# Patient Record
Sex: MALE | Race: WHITE | NOT HISPANIC OR LATINO | Employment: OTHER | URBAN - METROPOLITAN AREA
[De-identification: names, ages, dates, MRNs, and addresses within clinical notes are randomized per-mention and may not be internally consistent; named-entity substitution may affect disease eponyms.]

---

## 2019-05-16 PROBLEM — M79.674 PAIN OF TOE OF RIGHT FOOT: Status: ACTIVE | Noted: 2019-05-16

## 2019-05-16 PROBLEM — E78.5 HLD (HYPERLIPIDEMIA): Status: ACTIVE | Noted: 2017-09-27

## 2019-05-16 PROBLEM — I10 ESSENTIAL HYPERTENSION: Status: ACTIVE | Noted: 2019-05-16

## 2019-05-16 PROBLEM — Z98.61 CAD S/P PERCUTANEOUS CORONARY ANGIOPLASTY: Status: ACTIVE | Noted: 2019-05-16

## 2019-05-16 PROBLEM — Z79.4 TYPE 2 DIABETES MELLITUS WITH DIABETIC NEUROPATHY, WITH LONG-TERM CURRENT USE OF INSULIN: Status: ACTIVE | Noted: 2019-05-16

## 2019-05-16 PROBLEM — E11.40 TYPE 2 DIABETES MELLITUS WITH DIABETIC NEUROPATHY, WITH LONG-TERM CURRENT USE OF INSULIN: Status: ACTIVE | Noted: 2019-05-16

## 2019-05-16 PROBLEM — I25.118 CORONARY ARTERY DISEASE OF NATIVE HEART WITH STABLE ANGINA PECTORIS: Status: ACTIVE | Noted: 2019-05-16

## 2019-05-16 PROBLEM — I25.10 CAD S/P PERCUTANEOUS CORONARY ANGIOPLASTY: Status: ACTIVE | Noted: 2019-05-16

## 2019-05-16 PROBLEM — G47.33 OSA (OBSTRUCTIVE SLEEP APNEA): Status: ACTIVE | Noted: 2017-09-27

## 2019-09-27 PROBLEM — E11.9 DIABETES MELLITUS WITHOUT COMPLICATION: Status: ACTIVE | Noted: 2019-09-27

## 2019-09-27 PROBLEM — H10.10 ALLERGIC CONJUNCTIVITIS: Status: ACTIVE | Noted: 2019-09-27

## 2019-11-22 PROBLEM — E11.40 TYPE 2 DIABETES MELLITUS WITH DIABETIC NEUROPATHY, WITH LONG-TERM CURRENT USE OF INSULIN: Status: RESOLVED | Noted: 2019-05-16 | Resolved: 2019-11-22

## 2019-11-22 PROBLEM — Z79.4 TYPE 2 DIABETES MELLITUS WITH DIABETIC NEUROPATHY, WITH LONG-TERM CURRENT USE OF INSULIN: Status: RESOLVED | Noted: 2019-05-16 | Resolved: 2019-11-22

## 2019-11-22 PROBLEM — S86.011A RUPTURE OF RIGHT ACHILLES TENDON: Status: ACTIVE | Noted: 2017-10-20

## 2019-11-28 PROBLEM — E11.8 TYPE 2 DIABETES MELLITUS WITH COMPLICATION, WITH LONG-TERM CURRENT USE OF INSULIN: Status: ACTIVE | Noted: 2017-08-08

## 2019-11-28 PROBLEM — Z79.4 TYPE 2 DIABETES MELLITUS WITH COMPLICATION, WITH LONG-TERM CURRENT USE OF INSULIN: Status: ACTIVE | Noted: 2017-08-08

## 2019-11-28 PROBLEM — R15.9 BOWEL INCONTINENCE: Status: ACTIVE | Noted: 2017-07-11

## 2019-12-20 PROBLEM — H40.003 GLAUCOMA SUSPECT OF BOTH EYES: Status: ACTIVE | Noted: 2019-12-20

## 2019-12-20 PROBLEM — H25.813 COMBINED FORMS OF AGE-RELATED CATARACT OF BOTH EYES: Status: ACTIVE | Noted: 2019-12-20

## 2020-01-02 PROBLEM — F33.1 MODERATE EPISODE OF RECURRENT MAJOR DEPRESSIVE DISORDER: Status: ACTIVE | Noted: 2020-01-02

## 2020-01-23 PROBLEM — R44.1 FORMED VISUAL HALLUCINATIONS: Status: ACTIVE | Noted: 2020-01-23

## 2020-12-07 PROBLEM — F33.3 MDD (MAJOR DEPRESSIVE DISORDER), RECURRENT, SEVERE, WITH PSYCHOSIS: Status: ACTIVE | Noted: 2020-01-02

## 2020-12-07 PROBLEM — F10.20 ALCOHOL USE DISORDER, SEVERE, DEPENDENCE: Status: ACTIVE | Noted: 2020-12-07

## 2021-05-12 ENCOUNTER — PATIENT MESSAGE (OUTPATIENT)
Dept: RESEARCH | Facility: HOSPITAL | Age: 56
End: 2021-05-12

## 2021-06-08 PROBLEM — M51.369 DEGENERATIVE DISC DISEASE, LUMBAR: Status: ACTIVE | Noted: 2021-06-08

## 2021-06-08 PROBLEM — J43.9 PULMONARY EMPHYSEMA: Status: ACTIVE | Noted: 2021-06-08

## 2021-06-08 PROBLEM — M51.36 DEGENERATIVE DISC DISEASE, LUMBAR: Status: ACTIVE | Noted: 2021-06-08

## 2021-06-08 PROBLEM — I77.810 ACQUIRED DILATION OF ASCENDING AORTA AND AORTIC ROOT: Status: ACTIVE | Noted: 2021-06-08

## 2021-06-08 PROBLEM — I77.819 ACQUIRED DILATION OF ASCENDING AORTA AND AORTIC ROOT: Status: ACTIVE | Noted: 2021-06-08

## 2021-07-24 PROBLEM — M54.9 BACK PAIN: Status: ACTIVE | Noted: 2021-07-24

## 2021-07-24 PROBLEM — R31.9 HEMATURIA: Status: ACTIVE | Noted: 2021-07-24

## 2021-07-24 PROBLEM — R07.9 CHEST PAIN: Status: ACTIVE | Noted: 2021-07-24

## 2021-07-24 PROBLEM — E87.1 HYPONATREMIA: Status: ACTIVE | Noted: 2021-07-24

## 2021-07-24 PROBLEM — N28.1 RENAL CYST: Status: ACTIVE | Noted: 2021-07-24

## 2021-07-24 PROBLEM — R19.7 DIARRHEA: Status: ACTIVE | Noted: 2021-07-24

## 2021-07-24 PROBLEM — J44.9 COPD (CHRONIC OBSTRUCTIVE PULMONARY DISEASE): Status: ACTIVE | Noted: 2021-07-24

## 2021-07-24 PROBLEM — N17.9 AKI (ACUTE KIDNEY INJURY): Status: ACTIVE | Noted: 2021-07-24

## 2021-11-29 PROBLEM — M54.9 BACK PAIN: Status: RESOLVED | Noted: 2021-07-24 | Resolved: 2021-11-29

## 2021-11-29 PROBLEM — R44.1 FORMED VISUAL HALLUCINATIONS: Status: RESOLVED | Noted: 2020-01-23 | Resolved: 2021-11-29

## 2021-11-29 PROBLEM — Z72.0 TOBACCO ABUSE: Status: ACTIVE | Noted: 2021-11-29

## 2021-11-29 PROBLEM — N17.9 AKI (ACUTE KIDNEY INJURY): Status: RESOLVED | Noted: 2021-07-24 | Resolved: 2021-11-29

## 2021-11-29 PROBLEM — S86.011A RUPTURE OF RIGHT ACHILLES TENDON: Status: RESOLVED | Noted: 2017-10-20 | Resolved: 2021-11-29

## 2021-11-29 PROBLEM — E87.1 HYPONATREMIA: Status: RESOLVED | Noted: 2021-07-24 | Resolved: 2021-11-29

## 2021-11-29 PROBLEM — M79.674 PAIN OF TOE OF RIGHT FOOT: Status: RESOLVED | Noted: 2019-05-16 | Resolved: 2021-11-29

## 2021-11-29 PROBLEM — R31.9 HEMATURIA: Status: RESOLVED | Noted: 2021-07-24 | Resolved: 2021-11-29

## 2021-11-29 PROBLEM — R07.9 CHEST PAIN: Status: RESOLVED | Noted: 2021-07-24 | Resolved: 2021-11-29

## 2021-11-29 PROBLEM — R19.7 DIARRHEA: Status: RESOLVED | Noted: 2021-07-24 | Resolved: 2021-11-29

## 2021-11-29 PROBLEM — R15.9 BOWEL INCONTINENCE: Status: RESOLVED | Noted: 2017-07-11 | Resolved: 2021-11-29

## 2022-03-14 PROBLEM — H10.10 ALLERGIC CONJUNCTIVITIS: Status: RESOLVED | Noted: 2019-09-27 | Resolved: 2022-03-14

## 2022-03-14 PROBLEM — H40.003 GLAUCOMA SUSPECT OF BOTH EYES: Status: RESOLVED | Noted: 2019-12-20 | Resolved: 2022-03-14

## 2022-09-29 PROBLEM — I50.33 ACUTE ON CHRONIC HEART FAILURE WITH PRESERVED EJECTION FRACTION: Status: ACTIVE | Noted: 2022-09-29

## 2022-09-29 PROBLEM — R07.89 OTHER CHEST PAIN: Status: ACTIVE | Noted: 2021-07-24

## 2022-09-30 PROBLEM — J06.9 UPPER RESPIRATORY TRACT INFECTION: Status: ACTIVE | Noted: 2022-09-30

## 2022-09-30 PROBLEM — E87.6 HYPOKALEMIA: Status: ACTIVE | Noted: 2022-09-30

## 2022-09-30 PROBLEM — I20.89 ANGINA AT REST: Status: ACTIVE | Noted: 2021-07-24

## 2022-10-01 PROBLEM — J44.1 COPD EXACERBATION: Status: ACTIVE | Noted: 2021-07-24

## 2022-10-02 PROBLEM — I49.8 VENTRICULAR BIGEMINY: Status: ACTIVE | Noted: 2022-10-02

## 2022-11-26 ENCOUNTER — PATIENT OUTREACH (OUTPATIENT)
Dept: ADMINISTRATIVE | Facility: HOSPITAL | Age: 57
End: 2022-11-26

## 2023-11-06 PROBLEM — F10.10 ALCOHOL USE DISORDER, MILD, ABUSE: Status: ACTIVE | Noted: 2020-12-07

## 2023-11-06 PROBLEM — F15.20 AMPHETAMINE USE DISORDER, MODERATE, DEPENDENCE: Status: ACTIVE | Noted: 2023-11-06

## 2023-12-03 PROBLEM — M86.9 OSTEOMYELITIS: Status: ACTIVE | Noted: 2023-12-03

## 2023-12-03 PROBLEM — J44.9 COPD (CHRONIC OBSTRUCTIVE PULMONARY DISEASE): Status: ACTIVE | Noted: 2023-12-03

## 2023-12-03 PROBLEM — I50.30 (HFPEF) HEART FAILURE WITH PRESERVED EJECTION FRACTION: Status: ACTIVE | Noted: 2023-12-03

## 2023-12-04 PROBLEM — B99.9 INFECTION: Status: RESOLVED | Noted: 2023-12-04 | Resolved: 2023-12-04

## 2023-12-04 PROBLEM — B99.9 INFECTION: Status: ACTIVE | Noted: 2023-12-04

## 2023-12-04 PROBLEM — R06.02 SHORTNESS OF BREATH: Status: ACTIVE | Noted: 2023-12-04

## 2023-12-04 PROBLEM — E11.65 TYPE 2 DIABETES MELLITUS WITH HYPERGLYCEMIA, WITH LONG-TERM CURRENT USE OF INSULIN: Status: ACTIVE | Noted: 2017-08-08

## 2023-12-06 PROBLEM — R45.89 ANXIETY ABOUT HEALTH: Status: ACTIVE | Noted: 2023-12-06

## 2023-12-06 PROBLEM — S91.309A OPEN WND OF FOOT: Status: ACTIVE | Noted: 2023-12-06

## 2023-12-06 PROBLEM — E83.39 HYPOPHOSPHATEMIA: Status: ACTIVE | Noted: 2023-12-06

## 2023-12-09 PROBLEM — K59.04 CHRONIC IDIOPATHIC CONSTIPATION: Status: ACTIVE | Noted: 2023-12-09

## 2023-12-09 PROBLEM — R06.02 SHORTNESS OF BREATH: Status: RESOLVED | Noted: 2023-12-04 | Resolved: 2023-12-09

## 2023-12-09 PROBLEM — E83.39 HYPOPHOSPHATEMIA: Status: RESOLVED | Noted: 2023-12-06 | Resolved: 2023-12-09

## 2024-02-06 PROBLEM — R53.83 FATIGUE: Status: ACTIVE | Noted: 2024-02-06

## 2024-02-06 PROBLEM — R53.83 FATIGUE: Chronic | Status: ACTIVE | Noted: 2024-02-06

## 2024-06-14 PROBLEM — T14.8XXA PUNCTURE WOUND: Status: ACTIVE | Noted: 2024-06-14

## 2024-06-14 PROBLEM — L03.114 CELLULITIS OF LEFT HAND: Status: ACTIVE | Noted: 2024-06-14

## 2024-06-14 PROBLEM — J44.9 CHRONIC OBSTRUCT AIRWAYS DISEASE: Status: ACTIVE | Noted: 2024-06-14

## 2024-07-08 PROBLEM — F17.200 TOBACCO DEPENDENCY: Status: ACTIVE | Noted: 2024-07-08

## 2024-07-08 PROBLEM — I25.10 CORONARY ARTERY DISEASE: Status: RESOLVED | Noted: 2024-07-08 | Resolved: 2024-07-08

## 2024-07-08 PROBLEM — M79.89 LEFT ARM SWELLING: Status: ACTIVE | Noted: 2024-07-08

## 2024-07-08 PROBLEM — I10 HTN (HYPERTENSION): Status: ACTIVE | Noted: 2024-07-08

## 2024-07-08 PROBLEM — L03.90 CELLULITIS: Status: ACTIVE | Noted: 2024-07-08

## 2024-07-08 PROBLEM — F19.10 IV DRUG ABUSE: Status: ACTIVE | Noted: 2024-07-08

## 2024-07-08 PROBLEM — L02.414 ABSCESS OF LEFT FOREARM: Status: ACTIVE | Noted: 2024-07-08

## 2024-07-08 PROBLEM — I25.10 CORONARY ARTERY DISEASE: Status: ACTIVE | Noted: 2024-07-08

## 2024-07-12 PROBLEM — L02.414 ABSCESS OF LEFT FOREARM: Status: RESOLVED | Noted: 2024-07-08 | Resolved: 2024-07-12

## 2024-07-12 PROBLEM — E87.1 HYPONATREMIA: Status: RESOLVED | Noted: 2021-07-24 | Resolved: 2024-07-12

## 2024-07-30 PROBLEM — L02.91 ABSCESS: Status: ACTIVE | Noted: 2024-07-30

## 2024-09-05 PROBLEM — E11.65 TYPE 2 DIABETES MELLITUS WITH HYPERGLYCEMIA, WITH LONG-TERM CURRENT USE OF INSULIN: Status: RESOLVED | Noted: 2017-08-08 | Resolved: 2024-09-05

## 2024-09-05 PROBLEM — F33.2 MDD (MAJOR DEPRESSIVE DISORDER), RECURRENT SEVERE, WITHOUT PSYCHOSIS: Status: ACTIVE | Noted: 2024-09-05

## 2024-09-05 PROBLEM — F33.9 RECURRENT MAJOR DEPRESSIVE DISORDER: Status: RESOLVED | Noted: 2024-09-05 | Resolved: 2024-09-05

## 2024-09-05 PROBLEM — E11.51 TYPE 2 DIABETES MELLITUS WITH DIABETIC PERIPHERAL ANGIOPATHY WITHOUT GANGRENE, WITH LONG-TERM CURRENT USE OF INSULIN: Status: ACTIVE | Noted: 2024-09-05

## 2024-09-05 PROBLEM — F15.10 AMPHETAMINE ABUSE: Status: ACTIVE | Noted: 2024-09-05

## 2024-09-05 PROBLEM — F14.10 COCAINE ABUSE: Status: ACTIVE | Noted: 2024-09-05

## 2024-09-05 PROBLEM — Z79.4 TYPE 2 DIABETES MELLITUS WITH HYPERGLYCEMIA, WITH LONG-TERM CURRENT USE OF INSULIN: Status: RESOLVED | Noted: 2017-08-08 | Resolved: 2024-09-05

## 2024-09-05 PROBLEM — F33.9 RECURRENT MAJOR DEPRESSIVE DISORDER: Status: ACTIVE | Noted: 2024-09-05

## 2024-09-05 PROBLEM — Z79.4 TYPE 2 DIABETES MELLITUS WITH DIABETIC PERIPHERAL ANGIOPATHY WITHOUT GANGRENE, WITH LONG-TERM CURRENT USE OF INSULIN: Status: ACTIVE | Noted: 2024-09-05

## 2024-09-05 PROBLEM — F10.11 ALCOHOL USE DISORDER, MILD, IN EARLY REMISSION: Status: ACTIVE | Noted: 2020-12-07

## 2024-09-16 PROBLEM — N17.9 AKI (ACUTE KIDNEY INJURY): Status: RESOLVED | Noted: 2021-07-24 | Resolved: 2024-09-16

## 2024-09-17 ENCOUNTER — PATIENT OUTREACH (OUTPATIENT)
Dept: ADMINISTRATIVE | Facility: HOSPITAL | Age: 59
End: 2024-09-17

## 2024-09-23 ENCOUNTER — PATIENT OUTREACH (OUTPATIENT)
Dept: ADMINISTRATIVE | Facility: HOSPITAL | Age: 59
End: 2024-09-23

## 2024-09-23 DIAGNOSIS — E11.51 TYPE 2 DIABETES MELLITUS WITH DIABETIC PERIPHERAL ANGIOPATHY WITHOUT GANGRENE, WITH LONG-TERM CURRENT USE OF INSULIN: Primary | ICD-10-CM

## 2024-09-23 DIAGNOSIS — Z79.4 TYPE 2 DIABETES MELLITUS WITH DIABETIC PERIPHERAL ANGIOPATHY WITHOUT GANGRENE, WITH LONG-TERM CURRENT USE OF INSULIN: Primary | ICD-10-CM

## 2024-12-09 ENCOUNTER — OUTPATIENT CASE MANAGEMENT (OUTPATIENT)
Dept: ADMINISTRATIVE | Facility: OTHER | Age: 59
End: 2024-12-09

## 2024-12-09 NOTE — PROGRESS NOTES
[facility] received a referral to assist with [employer] resources. [facility] had received a consult to see Patient. However, it was noted Patient was referred to [facility] for aftercare when he was discharged from [facility] inpatient on 9/12/2024. [facility] mailed Patient a letter encouraging him to contact them to schedule a new assessment if he was still in need of services.    Emily Mathias LCSW    [phone]

## 2025-02-16 PROBLEM — I95.9 HYPOTENSION: Status: ACTIVE | Noted: 2025-02-16

## 2025-02-16 PROBLEM — J44.9 COPD (CHRONIC OBSTRUCTIVE PULMONARY DISEASE): Status: RESOLVED | Noted: 2023-12-03 | Resolved: 2025-02-16

## 2025-02-16 PROBLEM — J44.1 COPD EXACERBATION: Status: RESOLVED | Noted: 2021-07-24 | Resolved: 2025-02-16

## 2025-02-17 PROBLEM — I95.9 HYPOTENSION: Status: RESOLVED | Noted: 2025-02-16 | Resolved: 2025-02-17

## 2025-02-17 PROBLEM — I77.819 AORTIC DILATATION: Status: ACTIVE | Noted: 2025-02-17
